# Patient Record
Sex: FEMALE | Employment: FULL TIME
[De-identification: names, ages, dates, MRNs, and addresses within clinical notes are randomized per-mention and may not be internally consistent; named-entity substitution may affect disease eponyms.]

---

## 2017-06-10 ENCOUNTER — HEALTH MAINTENANCE LETTER (OUTPATIENT)
Age: 52
End: 2017-06-10

## 2018-09-06 ENCOUNTER — OFFICE VISIT (OUTPATIENT)
Dept: FAMILY MEDICINE | Facility: CLINIC | Age: 53
End: 2018-09-06
Payer: COMMERCIAL

## 2018-09-06 VITALS
DIASTOLIC BLOOD PRESSURE: 84 MMHG | BODY MASS INDEX: 28.95 KG/M2 | HEART RATE: 60 BPM | OXYGEN SATURATION: 99 % | WEIGHT: 191 LBS | HEIGHT: 68 IN | RESPIRATION RATE: 20 BRPM | SYSTOLIC BLOOD PRESSURE: 120 MMHG | TEMPERATURE: 97.3 F

## 2018-09-06 DIAGNOSIS — B35.3 TINEA PEDIS OF BOTH FEET: Primary | ICD-10-CM

## 2018-09-06 PROCEDURE — 99213 OFFICE O/P EST LOW 20 MIN: CPT | Performed by: NURSE PRACTITIONER

## 2018-09-06 NOTE — PROGRESS NOTES
"  SUBJECTIVE:   Roberta Lee is a 53 year old female who presents to clinic today for the following health issues:      Rash  Onset: 6 months    Description: Pt states that she has always had a rash on her feet, but it started to get worse over the last 6 months. Pt thinks it could be infected.  Location: Bilateral Rash/ skin breakdown on all toes.  Character: painful, draining, red  Itching (Pruritis): YES    Progression of Symptoms:  worsening    Accompanying Signs & Symptoms:  Fever: no   Body aches or joint pain: Back pain started x 1 month ago.   Sore throat symptoms: YES  Recent cold symptoms: YES. 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    History:   Previous similar rash: YES    Precipitating factors:   Exposure to similar rash: no   New exposures: None   Recent travel: no     Alleviating factors:  None    Therapies Tried and outcome: None    Problem list and histories reviewed & adjusted, as indicated.  Additional history: as documented    Patient Active Problem List   Diagnosis     Depressive disorder, not elsewhere classified     Anxiety state     Past Surgical History:   Procedure Laterality Date     C APPENDECTOMY       C  DELIVERY ONLY      Primary C/S     C  DELIVERY ONLY      Repeat C/S     C  DELIVERY ONLY      Repeat C/S     C LIGATE FALLOPIAN TUBE       C NONSPECIFIC PROCEDURE      Right wrist fracture in [employer] -- Abstracted 02       Social History   Substance Use Topics     Smoking status: Current Every Day Smoker     Years: 20.00     Smokeless tobacco: Never Used      Comment: 1 cig per day     Alcohol use 2.0 oz/week      Comment: Occasional, mostly beer     Family History   Problem Relation Age of Onset     Cerebrovascular Disease Brother       age 42 of a \"massive stroke\"     Diabetes Father      Born in 1935     Family History Negative Mother      Born in 1938         No " "current outpatient prescriptions on file.     Allergies   Allergen Reactions     No Known Allergies        Reviewed and updated as needed this visit by clinical staff  Tobacco  Allergies  Meds  Problems  Med Hx  Surg Hx  Fam Hx  Soc Hx        Reviewed and updated as needed this visit by Provider  Allergies  Meds  Problems         ROS:  CONSTITUTIONAL: NEGATIVE for fever, chills, change in weight  INTEGUMENTARY/SKIN: POSITIVE for rash around toes bilaterally worse on the right  RESP: NEGATIVE for significant cough or SOB  CV: NEGATIVE for chest pain, palpitations or peripheral edema  PSYCHIATRIC: NEGATIVE for changes in mood or affect  ROS otherwise negative    OBJECTIVE:     /84  Pulse 60  Temp 97.3  F (36.3  C) (Tympanic)  Resp 20  Ht 5' 7.75\" (1.721 m)  Wt 191 lb (86.6 kg)  SpO2 99%  BMI 29.26 kg/m2  Body mass index is 29.26 kg/(m^2).  GENERAL: healthy, alert and no distress  SKIN: Patient has fungal rash around toes that is not currently draining but is between all the toes and under toes causing skin peeling and cracking in the skin.  No signs or symptoms of infection.  Pruritic and burning but no warmth on palpation.  PSYCH: mentation appears normal, affect normal/bright    Diagnostic Test Results:  none     ASSESSMENT/PLAN:     1. Tinea pedis of both feet  Recommend over the counter Lamisil cream or spray for up to 2 weeks for treatment.  Discussed importance of keeping feet dry.  If symptoms persist despite treatment, f/u in clinic for re-evaluation.    See Patient Instructions    Kylah Perry NP  [facility]    "

## 2018-09-06 NOTE — MR AVS SNAPSHOT
"              After Visit Summary   2018    Roberta Chester    MRN: [id number]           Patient Information     Date Of Birth          1965        Visit Information        Provider Department      2018 9:20 AM Kylah Perry NP [facility]        Care Instructions    1.  Apply Lamisil to both feet daily for up to 2 weeks.  2.  If this is not beneficial to symptoms, follow-up in clinic for re-evaluation.          Follow-ups after your visit        Who to contact     If you have questions or need follow up information about today's clinic visit or your schedule please contact [facility] directly at [phone].  Normal or non-critical lab and imaging results will be communicated to you by LifeDoxhart, letter or phone within 4 business days after the clinic has received the results. If you do not hear from us within 7 days, please contact the clinic through LifeDoxhart or phone. If you have a critical or abnormal lab result, we will notify you by phone as soon as possible.  Submit refill requests through MediaSpike or call your pharmacy and they will forward the refill request to us. Please allow 3 business days for your refill to be completed.          Additional Information About Your Visit        MyChart Information     MediaSpike lets you send messages to your doctor, view your test results, renew your prescriptions, schedule appointments and more. To sign up, go to [email or web address] . Click on \"Log in\" on the left side of the screen, which will take you to the Welcome page. Then click on \"Sign up Now\" on the right side of the page.     You will be asked to enter the access code listed below, as well as some personal information. Please follow the directions to create your username and password.     Your access code is: [email or web address]  Expires: 2018  9:53 AM     Your access code will  in 90 days. If you need help or a new code, please call your " "[facility] or [phone].        Care EveryWhere ID     This is your Care EveryWhere ID. This could be used by other organizations to access your [facility] records  [id number]        Your Vitals Were     Pulse Temperature Respirations Height Pulse Oximetry BMI (Body Mass Index)    60 97.3  F (36.3  C) (Tympanic) 20 5' 7.75\" (1.721 m) 99% 29.26 kg/m2       Blood Pressure from Last 3 Encounters:   09/06/18 120/84   11/29/06 120/72   06/02/06 110/68    Weight from Last 3 Encounters:   09/06/18 191 lb (86.6 kg)   11/29/06 191 lb (86.6 kg)   06/02/06 188 lb (85.3 kg)              Today, you had the following     No orders found for display       Primary Care Provider Office Phone # Fax #    Kapil Davalos [phone]       [address] [place] [address]        Equal Access to Services     [facility]: Hadii chelly ku hadasho Soomaali, waaxda luqadaha, qaybta kaalmada adeegyada, waxay luanne bobo . So [facility] [phone].    ATENCIÓN: Si habla español, tiene a woo disposición servicios gratuitos de asistencia lingüística. Llame al [phone].    We comply with applicable federal civil rights laws and [place] laws. We do not discriminate on the basis of race, color, national origin, age, disability, sex, sexual orientation, or gender identity.            Thank you!     Thank you for choosing [facility]  for your care. Our goal is always to provide you with excellent care. Hearing back from our patients is one way we can continue to improve our services. Please take a few minutes to complete the written survey that you may receive in the mail after your visit with us. Thank you!             Your Updated Medication List - Protect others around you: Learn how to safely use, store and throw away your medicines at www.disposemymeds.org.      Notice  As of 9/6/2018  9:53 AM    You have not been prescribed any medications.      "

## 2018-09-06 NOTE — PATIENT INSTRUCTIONS
1.  Apply Lamisil to both feet daily for up to 2 weeks.  2.  If this is not beneficial to symptoms, follow-up in clinic for re-evaluation.

## 2019-07-18 ENCOUNTER — TELEPHONE (OUTPATIENT)
Dept: INTERNAL MEDICINE | Facility: CLINIC | Age: 54
End: 2019-07-18

## 2019-07-18 NOTE — TELEPHONE ENCOUNTER
Panel Management Review      Patient has the following on her problem list: None      Composite cancer screening  Chart review shows that this patient is due/due soon for the following Pap Smear, Mammogram and Colonoscopy  Summary:    Patient is due/failing the following:   COLONOSCOPY, MAMMOGRAM and PAP    Action needed:   Patient needs office visit for pap. and Patient needs referral/order: mammogram and colonoscopy    Type of outreach:    Sent letter.    Questions for provider review:    None                                                                                                                                    CHANG SAMPSON CMA       Chart routed to no one .

## 2019-07-18 NOTE — LETTER
7/18/2019  Roberta Chester  [address]  [place] [place] [address]    Dear Roberta    We care about your health and have reviewed your health plan. We have reviewed your medical conditions, medication list, and lab results and am making recommendations based on this review, to better manage your health.    You are in particular need of attention regarding:  -Breast Cancer Screening  -Colon Cancer Screening  -Cervical Cancer Screening    We am recommending that you:  -schedule a MAMMOGRAM which is due.    1 in 8 women will develop invasive breast cancer during her lifetime and it is the most common non-skin cancer in American women.  EARLY detection, new treatments, and a better understanding of the disease have increased survival rates - the 5 year survival rate in the 1960s was 63% and today it is close to 90%.    If you are under/uninsured, we recommend you contact the [facility]. They offer mammograms at no charge or on a sliding fee charge. You can schedule with them at [phone]. Please have them send us the results.      Please disregard this reminder if you have had this exam elsewhere within the last year.  It would be helpful for us to have a copy of your mammogram report in your file so that we can best coordinate your care - please contact us with when your test was done so we can update your record.             -schedule a COLONOSCOPY to look for colon cancer (due every 10 years or 5 years in higher risk situations.)        Colon cancer is now the second leading cause of cancer-related deaths in the [place] for both men and women and there are over 130,000 new cases and 50,000 deaths per year from colon cancer.  Colonoscopies can prevent 90-95% of these deaths.  Problem lesions can be removed before they ever become cancer.  This test is not only looking for cancer, but also getting rid of precancerious lesions.    If you are under/uninsured, we recommend you contact the [facility]. [facility] is a free colorectal cancer screening program that provides colonoscopies for eligible under/uninsured [place] men and women. If you are interested in receiving a free colonoscopy, please call [facility] at [phone] (mention code [facility]) to see if you re eligible.      If you do not wish to do a colonoscopy or cannot afford to do one, at this time, there is another option. It is called a FIT test or Fecal Immunochemical Occult Blood Test (take home stool sample kit).  It does not replace the colonoscopy for colorectal cancer screening, but it can detect hidden bleeding in the lower colon.  It does need to be repeated every year and if a positive result is obtained, you would be referred for a colonoscopy.          If you have completed either one of these tests at another facility, please call with the details of when and where the tests were done and if they were normal or not. Or have the records sent to our clinic so that we can best coordinate your care.    -schedule a PAP SMEAR EXAM which is due.  Please disregard this reminder if you have had this exam elsewhere within the last year.  It would be helpful for us to have a copy of your recent pap smear report in our file so that we can best coordinate your care.    If you are under/uninsured, we recommend you contact the [facility]. They offer pap smears at no charge or on a sliding fee charge. You can schedule with them at [phone]. Please have them send us the results.      Health Maintenance Due   Topic Date Due     DEPRESSION ACTION PLAN  1965     PHQ-9  1965     COLONOSCOPY  03/21/1977     HIV SCREENING  03/21/1982     DTAP/TDAP/TD IMMUNIZATION (1 - Tdap) 03/21/1992     PREVENTIVE CARE VISIT  02/10/2006     MAMMO SCREENING  03/11/2007     PAP  02/10/2008     LIPID  03/21/2012     ZOSTER IMMUNIZATION (1 of 2) 03/21/2017         Please call us at [phone] ([place]) or use [employer] to address  the above recommendations.     Thank you for trusting [facility] and we appreciate the opportunity to serve you.  We look forward to supporting your healthcare needs in the future.    Healthy Regards,    Your [facility]  July 18, 2019

## 2021-06-24 ENCOUNTER — TELEPHONE (OUTPATIENT)
Dept: INTERNAL MEDICINE | Facility: CLINIC | Age: 56
End: 2021-06-24

## 2021-06-24 NOTE — TELEPHONE ENCOUNTER
Lump on right side/front of neck.  Ongoing but getting worse in the last 4 days. Size inbetween a pea and marble. Tender to touch.  Feels hard. Not hot, no red streaking.  No fever known.  Does not stop her from moving her head.     Advised to be seen.  Appointment made for Monday.  Tom Lemon, HARVEYN, RN, PHN  [facility] ~ Registered Nurse  Clinic Triage ~ [place] [facility]  June 24, 2021

## 2021-06-28 ENCOUNTER — OFFICE VISIT (OUTPATIENT)
Dept: FAMILY MEDICINE | Facility: CLINIC | Age: 56
End: 2021-06-28
Payer: COMMERCIAL

## 2021-06-28 VITALS
TEMPERATURE: 97.8 F | HEART RATE: 84 BPM | HEIGHT: 67 IN | RESPIRATION RATE: 18 BRPM | WEIGHT: 207.8 LBS | SYSTOLIC BLOOD PRESSURE: 122 MMHG | BODY MASS INDEX: 32.62 KG/M2 | DIASTOLIC BLOOD PRESSURE: 80 MMHG

## 2021-06-28 DIAGNOSIS — F32.0 MILD MAJOR DEPRESSION (H): ICD-10-CM

## 2021-06-28 DIAGNOSIS — L72.3 INFLAMED EPIDERMOID CYST OF SKIN: Primary | ICD-10-CM

## 2021-06-28 PROCEDURE — 99214 OFFICE O/P EST MOD 30 MIN: CPT | Performed by: PHYSICIAN ASSISTANT

## 2021-06-28 RX ORDER — CEPHALEXIN 500 MG/1
500 CAPSULE ORAL 3 TIMES DAILY
Qty: 21 CAPSULE | Refills: 0 | Status: SHIPPED | OUTPATIENT
Start: 2021-06-28 | End: 2021-07-05

## 2021-06-28 ASSESSMENT — ANXIETY QUESTIONNAIRES
6. BECOMING EASILY ANNOYED OR IRRITABLE: SEVERAL DAYS
7. FEELING AFRAID AS IF SOMETHING AWFUL MIGHT HAPPEN: SEVERAL DAYS
3. WORRYING TOO MUCH ABOUT DIFFERENT THINGS: NOT AT ALL
1. FEELING NERVOUS, ANXIOUS, OR ON EDGE: SEVERAL DAYS
5. BEING SO RESTLESS THAT IT IS HARD TO SIT STILL: NOT AT ALL
GAD7 TOTAL SCORE: 3
2. NOT BEING ABLE TO STOP OR CONTROL WORRYING: NOT AT ALL

## 2021-06-28 ASSESSMENT — PATIENT HEALTH QUESTIONNAIRE - PHQ9
SUM OF ALL RESPONSES TO PHQ QUESTIONS 1-9: 6
5. POOR APPETITE OR OVEREATING: NOT AT ALL

## 2021-06-28 ASSESSMENT — PAIN SCALES - GENERAL: PAINLEVEL: MODERATE PAIN (5)

## 2021-06-28 ASSESSMENT — MIFFLIN-ST. JEOR: SCORE: 1579.16

## 2021-06-28 NOTE — PROGRESS NOTES
"Assessment & Plan   Inflamed epidermoid cyst of skin  Patient reports her 1 week history of a swollen painful and red lump on her neck.  States that she has had a \"blackhead\" there for many years and she scratched it the other day.  After that she noticed redness and pain and swelling started.  Exam is mostly consistent with an inflamed cyst.  There is no true fluctuance.  Shared decision making discussed.  Patient did not want an I&D today.  I do not think it is significantly infected either.  More likely to be inflamed that will improve with warm compresses and ibuprofen.  Rx for Keflex as well just in case.  If symptoms not improve significantly over the next week patient will return to clinic for I&D or sooner for any new, concerning, worsening symptoms.  - cephALEXin (KEFLEX) 500 MG capsule; Take 1 capsule (500 mg) by mouth 3 times daily for 7 days    Mild major depression (H)  PHQ-9 is within normal limits today.  Stable.  Continue lifestyle changes.     Tobacco Cessation:   reports that she has been smoking. She has smoked for the past 20.00 years. She has never used smokeless tobacco.    BMI:   Estimated body mass index is 32.3 kg/m  as calculated from the following:    Height as of this encounter: 1.708 m (5' 7.25\").    Weight as of this encounter: 94.3 kg (207 lb 12.8 oz).       Return in about 1 week (around 7/5/2021), or if symptoms worsen or fail to improve, for In-Clinic Visit.    HÉCTOR Verma [facility]    Bindu Granados is a 56 year old who presents for the following health issues     HPI     Concern - Lump on neck   Onset: One week  Description: Says that she had always seemed to have a black head in this spot. Would make sure to clean it and it never really got to a point where she would have to squeeze it. Says that in the last week it has gotten bigger become firm and is painful   Intensity: moderate  Progression of Symptoms:  worsening  Accompanying " "Signs & Symptoms: pain and redness   Previous history of similar problem: na   Precipitating factors:        Worsened by: na  Alleviating factors:        Improved by: na  Therapies tried and outcome:  none   Denies any fevers, chills, fatigue, body aches.    Review of Systems   See HPI      Objective    /80 (BP Location: Right arm, Patient Position: Sitting, Cuff Size: Adult Large)   Pulse 84   Temp 97.8  F (36.6  C) (Tympanic)   Resp 18   Ht 1.708 m (5' 7.25\")   Wt 94.3 kg (207 lb 12.8 oz)   BMI 32.30 kg/m    Body mass index is 32.3 kg/m .  Physical Exam   Constitutional: healthy, alert, and no distress  Head: Normocephalic. Atraumatic  Eyes: No conjunctival injection, sclera anicteric  Respiratory: No resp distress.  Musculoskeletal: extremities normal- no gross deformities noted, and normal muscle tone  Neurologic: Gait normal. CN 2-12 grossly intact  Psychiatric: mentation appears normal and affect normal/bright  Skin: There is a 3 cm in diameter area of induration with overlying erythema that is moderately tender.  No surrounding erythema, streaking.  No significant fluctuance.            "

## 2021-06-28 NOTE — PATIENT INSTRUCTIONS
I think this is an inflammed cyst. I would like you to take Ibuprofen 600-800 mg three times per day for the next 1 week. You can take this at the same time as your keflex (antibiotic).     When at home, use warm compresses 3-4 times per day. 20 minutes on.     Patient Education     Infected Epidermoid Cyst (Antibiotic Treatment)   You have an epidermoid cyst. This is a small, painless lump under your skin. An epidermoid cyst is often called an epidermal cyst, an epidermal inclusion cyst, or incorrectly, a sebaceous cyst. Epidermoid cysts form slowly under the skin. They can be found on most parts of the body. But they are most often found on areas with more hair such as the scalp, face, upper back, and genitals.   Here are some general facts about these cysts:     A cyst is a sac filled with material that is often cheesy, fatty, oily, or stringy. The material inside can be thick. Or it can be a liquid.    The area around the cyst may smell bad. If the cyst breaks open, the material inside it often smells bad as well.    You can usually move the cyst slightly if you try.    The cyst can be smaller than a pea or as large as a few inches.    The cyst is usually not painful, unless it becomes inflamed or infected.  Your cyst became infected and your healthcare provider wants to treat it with antibiotics. You will likely take the antibiotics by mouth or apply it as a cream, or both. If the antibiotics don t clear up the infection, the cyst will need to be drained by making a small cut (incision). Local anesthesia will be used to numb the area before the incision and drainage.   Home care    Resist the temptation to squeeze or pop the cyst, stick a needle in it, or cut it open. This often leads to a worsening infection and scarring.    If antibiotic pills were prescribed, take them exactly as directed. Finish the antibiotic prescribed, even though you may feel better after the first few days.    Soak the affected area in  hot water or apply a hot pack (a thin, clean towel soaked in hot water) for 20 minutes at a time. Do this 3 to 4 times a day.    If your healthcare provider recommended it, apply antibiotic cream or ointment 2 to 3 times a day.    You may use over-the-counter pain medicine to control pain, unless another medicine was given. If you have chronic liver or kidney disease or ever had a stomach ulcer or gastrointestinal bleeding, talk with your healthcare provider before using these medicines.    Prevention  Once this infection has healed, reduce the risk of future infections by:     Keeping the cyst area clean by bathing or showering daily    Avoiding tight-fitting clothing in the cyst area  Follow-up care  Follow up with your healthcare provider, or as advised. If a gauze packing was put in your wound, it should be removed in a few days as advised by your healthcare provider. Check your wound every day for the signs listed below.   When to seek medical advice  Call your healthcare provider right away if any of these occur:     Pus coming from the cyst    Increasing redness around the wound    Increasing local pain or swelling    Fever of 100.4 F (38 C) or higher, or as directed by your provider  [place] last reviewed this educational content on 7/1/2019 2000-2021 [employer]. All rights reserved. This information is not intended as a substitute for professional medical care. Always follow your healthcare professional's instructions.            Scheduled for induction due to post dates gestation

## 2021-06-29 ASSESSMENT — ANXIETY QUESTIONNAIRES: GAD7 TOTAL SCORE: 3

## 2021-07-08 ENCOUNTER — OFFICE VISIT (OUTPATIENT)
Dept: FAMILY MEDICINE | Facility: CLINIC | Age: 56
End: 2021-07-08
Payer: COMMERCIAL

## 2021-07-08 VITALS
WEIGHT: 207 LBS | DIASTOLIC BLOOD PRESSURE: 96 MMHG | BODY MASS INDEX: 32.18 KG/M2 | HEART RATE: 70 BPM | SYSTOLIC BLOOD PRESSURE: 132 MMHG

## 2021-07-08 DIAGNOSIS — L02.11 CUTANEOUS ABSCESS OF NECK: Primary | ICD-10-CM

## 2021-07-08 PROCEDURE — 99213 OFFICE O/P EST LOW 20 MIN: CPT | Mod: 25 | Performed by: PHYSICIAN ASSISTANT

## 2021-07-08 PROCEDURE — 10060 I&D ABSCESS SIMPLE/SINGLE: CPT | Performed by: PHYSICIAN ASSISTANT

## 2021-07-08 RX ORDER — LIDOCAINE HYDROCHLORIDE AND EPINEPHRINE 10; 10 MG/ML; UG/ML
2 INJECTION, SOLUTION INFILTRATION; PERINEURAL ONCE
Status: COMPLETED | OUTPATIENT
Start: 2021-07-08 | End: 2021-07-08

## 2021-07-08 RX ORDER — DOXYCYCLINE 100 MG/1
100 CAPSULE ORAL 2 TIMES DAILY
Qty: 14 CAPSULE | Refills: 0 | Status: SHIPPED | OUTPATIENT
Start: 2021-07-08

## 2021-07-08 RX ADMIN — LIDOCAINE HYDROCHLORIDE AND EPINEPHRINE 2 ML: 10; 10 INJECTION, SOLUTION INFILTRATION; PERINEURAL at 10:29

## 2021-07-08 ASSESSMENT — PAIN SCALES - GENERAL: PAINLEVEL: MODERATE PAIN (4)

## 2021-07-08 NOTE — PROGRESS NOTES
"Assessment & Plan   Cutaneous abscess of neck  Patient presents after 1 week of attempting to treat her abscess with antibiotics and warm compresses.  Her symptoms have persisted and the area has started to drain.  She is here for I&D.  Exam is as expected.  I&D was performed with a moderate amount of purulent drainage.  The cavity was packed with iodoform gauze which should be removed in 2 to 3 days.  Patient was comfortable doing this at home. Rx for doxycycline due the size of the abscess.  Wound care discussed.  She will monitor for signs and symptoms of worsening infection and return to clinic as needed for any new, concerning, worsening symptoms.  - lidocaine 1% with EPINEPHrine 1:100,000 injection 2 mL  - doxycycline hyclate (VIBRAMYCIN) 100 MG capsule; Take 1 capsule (100 mg) by mouth 2 times daily  - DRAIN SKIN ABSCESS SIMPLE/SINGLE     Tobacco Cessation:   reports that she has been smoking. She has smoked for the past 20.00 years. She has never used smokeless tobacco.    BMI:   Estimated body mass index is 32.18 kg/m  as calculated from the following:    Height as of 6/28/21: 1.708 m (5' 7.25\").    Weight as of this encounter: 93.9 kg (207 lb).     Return in about 1 week (around 7/15/2021), or if symptoms worsen or fail to improve, for In-Clinic Visit.    HÉCTOR Verma [facility]    Bindu Granados is a 56 year old who presents for the following health issues    HPI   Cellulitis   -Says that she has been trying to do the compress as told.   -Over the weekend it started to drain   There is still ongoing pain, redness.  She is here for incision and drainage.  Denies any fevers or chills, nausea or vomiting or any other systemic symptom.    Review of Systems   See HPI      Objective    BP (!) 132/96 (BP Location: Right arm, Patient Position: Sitting, Cuff Size: Adult Large)   Pulse 70   Wt 93.9 kg (207 lb)   BMI 32.18 kg/m    Body mass index is 32.18 " kg/m .  Physical Exam   Constitutional: healthy, alert, and no distress  Head: Normocephalic. Atraumatic  Eyes: No conjunctival injection, sclera anicteric  Neck: There is a 2.5 cm in diameter area of fluctuance, erythema, tenderness over the right anterior aspect of the neck.  Not pulsatile.  Respiratory: No resp distress.  Musculoskeletal: extremities normal- no gross deformities noted, and normal muscle tone  Neurologic: Gait normal. CN 2-12 grossly intact  Psychiatric: mentation appears normal and affect normal/bright    Procedure Note:   I&D - The area was prepped with alcohol. 2 cc's of 1% Lidocaine with Epi was used to anesthestized the area. Using an 11 blade, an incision was made over the most fluctuant area. The area was then drained with a moderate amount of purulent discharge. It was packed with idioform dressing, approximately 3 inches and dressed/covered. Pt tolerated procedure well without complications. EBL was minimal.

## 2021-07-08 NOTE — PATIENT INSTRUCTIONS
You did great today!  In 2 to 3 days time I would like you to remove the gauze from the area at home per your comfort.  The gauze should just come right out there may be a little pain associate with this.  It is probably easiest to do this in the shower when it is a little wet.    If you are uncomfortable with removing the gauze please remember that we can have this done in clinic by our nursing staff.    Every 24 hours please change her bandage. You do not need to scrub it very hard just softly with warm soapy water. Place an antibiotic ointment on the pad of the Band-Aid in place over the area.      Monitor for signs of worsening affection including fevers, chills, worsening pain, worsening warmth or any redness that expands from the area.    Patient Education     Wound Check, Infection  You have a wound that has become infected. The wound will not heal properly unless the infection is cleared. Infection in a wound may also spread if it is not treated. In most cases, antibiotic medicines are prescribed to treat a wound infection.   Symptoms of a wound infection include:    Redness or swelling around the wound    Warmth coming from the wound    New or worsening pain    Red streaks around the wound    Draining pus    Fever  Home care  Follow all directions you are given to treat the infection.  Medicines  Take all medicines as prescribed.     If you were given antibiotics, take them until they are gone or your healthcare provider tells you to stop. It is vital to finish the antibiotics even if you feel better. If you don't finish them, the infection may come back and be harder to treat.    If your infection is not responding to the medicines you are taking, you may be prescribed new medicines.    Take medicine for pain as directed by your healthcare provider.  Wound care  Care for your wound as directed by your healthcare provider.    Apply a warm compress (clean cloth soaked in hot water) to the infected area for  about 5 to 10 minutes at a time. Be very careful not to burn yourself. Test the cloth on a non-infected area to make sure it is not too hot.    Continue to change the dressing daily. If it becomes wet, stained with wound fluid, or dirty, change it sooner. To change it:  ? Wash your hands with soap and water before changing the dressing.  ? Carefully remove the dressing and tape. If it sticks to the wound, you may need to wet it a little to remove it. (Don't do this if your healthcare provider has told you not to.)  ? Gently clean the wound with clean water (or saline) using gauze, a clean washcloth, or cotton swab.  ? Don't use soap, alcohol, peroxide or other cleansers.  ? If you were told to dry the wound before putting on a new dressing, gently pat. Don't rub.  ? Throw out the old dressing.  ? Wash your hands again before opening the new, clean dressing.  ? Wash your hands again when you are done.  Follow-up care  Follow up with your healthcare provider as advised. If a culture was done, you will be notified if your treatment needs to change. Call as directed for the results.  When to seek medical advice  Call your healthcare provider right away if any of these occur:    Symptoms of infection don't start to improve within 2 days of starting antibiotics    Symptoms of infection get worse    New symptoms, such as red streaks around the wound    Fever of 100.4 F (38.0 C) or higher for more than 2 days after starting the antibiotics, or as advised by your healthcare provider  [employer] last reviewed this educational content on 3/1/2018    3314-9563 [employer]. All rights reserved. This information is not intended as a substitute for professional medical care. Always follow your healthcare professional's instructions.

## 2025-03-03 ENCOUNTER — OFFICE VISIT (OUTPATIENT)
Dept: PEDIATRICS | Facility: CLINIC | Age: 60
End: 2025-03-03
Payer: COMMERCIAL

## 2025-03-03 ENCOUNTER — TELEPHONE (OUTPATIENT)
Dept: FAMILY MEDICINE | Facility: CLINIC | Age: 60
End: 2025-03-03

## 2025-03-03 VITALS
TEMPERATURE: 97.5 F | HEART RATE: 72 BPM | SYSTOLIC BLOOD PRESSURE: 166 MMHG | OXYGEN SATURATION: 96 % | RESPIRATION RATE: 18 BRPM | DIASTOLIC BLOOD PRESSURE: 86 MMHG

## 2025-03-03 DIAGNOSIS — S01.511A INFECTED LIP LACERATION, INITIAL ENCOUNTER: Primary | ICD-10-CM

## 2025-03-03 DIAGNOSIS — S02.5XXA CLOSED FRACTURE OF TOOTH, INITIAL ENCOUNTER: ICD-10-CM

## 2025-03-03 DIAGNOSIS — L08.9 INFECTED LIP LACERATION, INITIAL ENCOUNTER: Primary | ICD-10-CM

## 2025-03-03 PROCEDURE — 1125F AMNT PAIN NOTED PAIN PRSNT: CPT | Performed by: EMERGENCY MEDICINE

## 2025-03-03 PROCEDURE — 3079F DIAST BP 80-89 MM HG: CPT | Performed by: EMERGENCY MEDICINE

## 2025-03-03 PROCEDURE — 90715 TDAP VACCINE 7 YRS/> IM: CPT | Performed by: EMERGENCY MEDICINE

## 2025-03-03 PROCEDURE — 3077F SYST BP >= 140 MM HG: CPT | Performed by: EMERGENCY MEDICINE

## 2025-03-03 PROCEDURE — 90471 IMMUNIZATION ADMIN: CPT | Performed by: EMERGENCY MEDICINE

## 2025-03-03 PROCEDURE — 99203 OFFICE O/P NEW LOW 30 MIN: CPT | Mod: 25 | Performed by: EMERGENCY MEDICINE

## 2025-03-03 RX ORDER — PENICILLIN V POTASSIUM 500 MG/1
500 TABLET, FILM COATED ORAL 3 TIMES DAILY
Qty: 21 TABLET | Refills: 0 | Status: SHIPPED | OUTPATIENT
Start: 2025-03-03 | End: 2025-03-10

## 2025-03-03 RX ORDER — METRONIDAZOLE 500 MG/1
500 TABLET ORAL 3 TIMES DAILY
Qty: 21 TABLET | Refills: 0 | Status: SHIPPED | OUTPATIENT
Start: 2025-03-03 | End: 2025-03-10

## 2025-03-03 ASSESSMENT — PAIN SCALES - GENERAL: PAINLEVEL_OUTOF10: MODERATE PAIN (5)

## 2025-03-03 NOTE — PROGRESS NOTES
Acute [facility] Diagnostic Services Clinic Visit    {PROVIDER CHARTING PREFERENCE:576802}    Subjective   Roberta is a 59 year old, presenting for the following health issues:  Mouth/Lip Problem    HPI      Concern - mouth infection  Onset: over the weekend  Description: tripped on concrete step, tooth possibly went through bottom lip - was fine at first but symptoms have worsened day by day. Now has visible swelling of the lip and face, redness, pain. Is concerned for infection. Says she maybe should have gone in for stitches.   Intensity: moderate  Progression of Symptoms:  worsening  Accompanying Signs & Symptoms: burning, stinging, pus  Previous history of similar problem: no  Precipitating factors:        Worsened by: chewing  Alleviating factors:        Improved by: ibuprofen  Therapies tried and outcome: OTC ibuprofen about every 12 hours gives some relief      {ROS Picklists (Optional):841867}      Objective    BP (!) 166/86   Pulse 72   Temp 97.5  F (36.4  C) (Tympanic)   Resp 18   SpO2 96%   There is no height or weight on file to calculate BMI.  Physical Exam   {Exam List (Optional):503531}    {Diagnostic Test Results (Optional):562769}        Signed Electronically by: RAMA YOUNG MD  {Email feedback regarding this note to [email or web address]   :[id number]}

## 2025-03-03 NOTE — PROGRESS NOTES
Impression:  1.  Lower lip laceration probably through and through, 3 days old.  2.  Fracture of the tip of the upper left central incisor 3.  Wound infection of the lower lip laceration    Plan:  Patient's tetanus status was updated with a Tdap booster.  Will start her on penicillin and Flagyl for wound infection.  Apply antibiotic ointment and then wash off the external wound 2-3 times per day to remove any crusting lesions.  Use mouthwash 2-3 times per day to decrease the bacterial burden on the inner lip.  Return or seek care if the redness and swelling and tenderness are not improving or do not resolve after a 7-day course of antibiotics.  See a dentist for the fractured tooth      Chief Complaint:  Patient presents with:  Mouth/Lip Problem         HPI:   Roberta Chester is a 59 year old female who presents to this clinic for the evaluation of lower lip laceration.  3 days ago the patient tripped and fell forward landing with her chin and lower lip against a piece of concrete.  She sustained a laceration to both the inner lip and the outer lip and chipped her upper left central incisor.  She did not seek care at that time but now presents because the area has gotten slightly swollen and red and tender to the touch.  No difficulty swallowing or breathing.  No loss of consciousness.  No neck pain.  No numbness or weakness in the extremities.  No other injuries.  No other complaints      PMH:   Past Medical History:   Diagnosis Date    Anxiety state, unspecified     Depressive disorder, not elsewhere classified     Abstracted 02     Past Surgical History:   Procedure Laterality Date    [facility] APPENDECTOMY      Z  DELIVERY ONLY      Primary C/S    ZZC  DELIVERY ONLY      Repeat C/S    ZZC  DELIVERY ONLY      Repeat C/S    ZC LIGATE FALLOPIAN TUBE      ZZ NONSPECIFIC PROCEDURE      Right wrist fracture in [employer] -- Abstracted 02          ROS:  All other systems negative    Meds:  No current outpatient medications on file.        Social:  Social History     Socioeconomic History    Marital status:      Spouse name: Not on file    Number of children: 3    Years of education: Not on file    Highest education level: Not on file   Occupational History    Occupation:    Tobacco Use    Smoking status: Every Day     Types: Cigarettes    Smokeless tobacco: Never    Tobacco comments:     1 cig per day   Substance and Sexual Activity    Alcohol use: Yes     Alcohol/week: 3.3 standard drinks of alcohol     Comment: Occasional, mostly beer    Drug use: No    Sexual activity: Not Currently     Partners: Male     Birth control/protection: Surgical     Comment: Tubal Ligation   Other Topics Concern    Parent/sibling w/ CABG, MI or angioplasty before 65F 55M? Not Asked   Social History Narrative    ** Merged History Encounter **         Working in [employer].      Social Drivers of Health     Financial Resource Strain: Not on file   Food Insecurity: Not on file   Transportation Needs: Not on file   Physical Activity: Not on file   Stress: Not on file   Social Connections: Not on file   Interpersonal Safety: Not on file   Housing Stability: Not on file         Physical Exam:  Vitals:    03/03/25 1105   BP: (!) 166/86   Pulse: 72   Resp: 18   Temp: 97.5  F (36.4  C)   TempSrc: Tympanic   SpO2: 96%      Patient is awake, alert, no distress  Eyes: PERRL, EOMI  Head: There is soft tissue swelling and erythema and tenderness over the lower lip just inferior to the vermilion border.  There is a crusted laceration on the external area of the lower lip in this area.  The inner mucosal lip shows a smooth laceration in the same area.  This may represent a through and through laceration of the lower lip.  The vermilion border is not involved.  There is some soft tissue swelling and erythema and tenderness surrounding the lower lip  laceration  Pharynx: Clear, airway patent, there is a chip off the tip of the left upper central incisor.  Dentition is otherwise intact  Neck: No mass or tenderness  Neuro: Normal motor and sensory function in all extremities  Psych: Awake, alert, normally responsive      Results:    No results found for this or any previous visit (from the past 24 hours).     No results found for this or any previous visit (from the past 24 hours).       Robin Das MD

## 2025-03-03 NOTE — TELEPHONE ENCOUNTER
Pt reports falling this weekend onto concrete step. Thinks her bottom teeth went thru skin between her bottom lip and chin. Pt was able to get wound to stop bleeding right away but this morning she noticed swelling, redness and a foul odor coming from her wound. Also there is pus coming out. No clinic appts avail. Recommended UC or ADS. Pt would like ADS if available. Denies any other injuries.   Will route to ADS        Roman Cuadra RN

## 2025-08-19 ENCOUNTER — OFFICE VISIT (OUTPATIENT)
Dept: OBGYN | Facility: CLINIC | Age: 60
End: 2025-08-19
Payer: COMMERCIAL

## 2025-08-19 VITALS
WEIGHT: 219 LBS | SYSTOLIC BLOOD PRESSURE: 146 MMHG | BODY MASS INDEX: 34.37 KG/M2 | DIASTOLIC BLOOD PRESSURE: 82 MMHG | HEART RATE: 84 BPM | HEIGHT: 67 IN

## 2025-08-19 DIAGNOSIS — N64.4 BREAST TENDERNESS IN FEMALE: ICD-10-CM

## 2025-08-19 DIAGNOSIS — Z11.3 SCREENING FOR STD (SEXUALLY TRANSMITTED DISEASE): ICD-10-CM

## 2025-08-19 DIAGNOSIS — N89.8 VAGINAL DISCHARGE: ICD-10-CM

## 2025-08-19 DIAGNOSIS — N95.2 VAGINAL ATROPHY: ICD-10-CM

## 2025-08-19 DIAGNOSIS — N95.0 POSTMENOPAUSAL BLEEDING: Primary | ICD-10-CM

## 2025-08-19 DIAGNOSIS — Z12.11 SCREENING FOR COLON CANCER: ICD-10-CM

## 2025-08-19 DIAGNOSIS — Z12.4 CERVICAL CANCER SCREENING: ICD-10-CM

## 2025-08-19 LAB
C TRACH DNA SPEC QL PROBE+SIG AMP: NEGATIVE
CLUE CELLS: PRESENT
ERYTHROCYTE [DISTWIDTH] IN BLOOD BY AUTOMATED COUNT: 12.7 % (ref 10–15)
HCT VFR BLD AUTO: 45.6 % (ref 35–47)
HGB BLD-MCNC: 15.6 G/DL (ref 11.7–15.7)
MCH RBC QN AUTO: 32.2 PG (ref 26.5–33)
MCHC RBC AUTO-ENTMCNC: 34.2 G/DL (ref 31.5–36.5)
MCV RBC AUTO: 94.2 FL (ref 78–100)
N GONORRHOEA DNA SPEC QL NAA+PROBE: NEGATIVE
PLATELET # BLD AUTO: 228 10E3/UL (ref 150–450)
PROLACTIN SERPL 3RD IS-MCNC: 16 NG/ML (ref 5–23)
RBC # BLD AUTO: 4.84 10E6/UL (ref 3.8–5.2)
SPECIMEN TYPE: NORMAL
T4 FREE SERPL-MCNC: 0.95 NG/DL (ref 0.9–1.7)
TRICHOMONAS, WET PREP: ABNORMAL
TSH SERPL DL<=0.005 MIU/L-ACNC: 7.14 UIU/ML (ref 0.3–4.2)
WBC # BLD AUTO: 5.62 10E3/UL (ref 4–11)
WBC'S/HIGH POWER FIELD, WET PREP: ABNORMAL
YEAST, WET PREP: ABNORMAL

## 2025-08-19 PROCEDURE — 87210 SMEAR WET MOUNT SALINE/INK: CPT | Performed by: PHYSICIAN ASSISTANT

## 2025-08-19 PROCEDURE — 84146 ASSAY OF PROLACTIN: CPT | Performed by: PHYSICIAN ASSISTANT

## 2025-08-19 PROCEDURE — 99204 OFFICE O/P NEW MOD 45 MIN: CPT | Mod: 25 | Performed by: PHYSICIAN ASSISTANT

## 2025-08-19 PROCEDURE — 99459 PELVIC EXAMINATION: CPT | Performed by: PHYSICIAN ASSISTANT

## 2025-08-19 PROCEDURE — 87624 HPV HI-RISK TYP POOLED RSLT: CPT | Performed by: PHYSICIAN ASSISTANT

## 2025-08-19 PROCEDURE — 3079F DIAST BP 80-89 MM HG: CPT | Performed by: PHYSICIAN ASSISTANT

## 2025-08-19 PROCEDURE — 3077F SYST BP >= 140 MM HG: CPT | Performed by: PHYSICIAN ASSISTANT

## 2025-08-19 PROCEDURE — 58100 BIOPSY OF UTERUS LINING: CPT | Performed by: PHYSICIAN ASSISTANT

## 2025-08-19 PROCEDURE — 84443 ASSAY THYROID STIM HORMONE: CPT | Performed by: PHYSICIAN ASSISTANT

## 2025-08-19 PROCEDURE — 84439 ASSAY OF FREE THYROXINE: CPT | Performed by: PHYSICIAN ASSISTANT

## 2025-08-19 PROCEDURE — 36415 COLL VENOUS BLD VENIPUNCTURE: CPT | Performed by: PHYSICIAN ASSISTANT

## 2025-08-19 PROCEDURE — 87491 CHLMYD TRACH DNA AMP PROBE: CPT | Performed by: PHYSICIAN ASSISTANT

## 2025-08-19 PROCEDURE — 87591 N.GONORRHOEAE DNA AMP PROB: CPT | Performed by: PHYSICIAN ASSISTANT

## 2025-08-19 PROCEDURE — 85027 COMPLETE CBC AUTOMATED: CPT | Performed by: PHYSICIAN ASSISTANT

## 2025-08-20 ENCOUNTER — HOSPITAL ENCOUNTER (OUTPATIENT)
Dept: ULTRASOUND IMAGING | Facility: CLINIC | Age: 60
Discharge: HOME | End: 2025-08-20
Attending: PHYSICIAN ASSISTANT
Payer: COMMERCIAL

## 2025-08-20 DIAGNOSIS — N95.0 POSTMENOPAUSAL BLEEDING: ICD-10-CM

## 2025-08-20 LAB
HPV HR 12 DNA CVX QL NAA+PROBE: NEGATIVE
HPV16 DNA CVX QL NAA+PROBE: NEGATIVE
HPV18 DNA CVX QL NAA+PROBE: NEGATIVE
HUMAN PAPILLOMA VIRUS FINAL DIAGNOSIS: NORMAL
PATH REPORT.COMMENTS IMP SPEC: NORMAL
PATH REPORT.COMMENTS IMP SPEC: NORMAL
PATH REPORT.FINAL DX SPEC: NORMAL
PATH REPORT.GROSS SPEC: NORMAL
PATH REPORT.MICROSCOPIC SPEC OTHER STN: NORMAL
PATH REPORT.RELEVANT HX SPEC: NORMAL
PHOTO IMAGE: NORMAL

## 2025-08-20 PROCEDURE — 76856 US EXAM PELVIC COMPLETE: CPT

## 2025-08-27 LAB
BKR AP ASSOCIATED HPV REPORT: ABNORMAL
BKR LAB AP GYN ADEQUACY: ABNORMAL
BKR LAB AP GYN INTERPRETATION: ABNORMAL
BKR LAB AP PREVIOUS ABNORMAL: ABNORMAL
PATH REPORT.COMMENTS IMP SPEC: ABNORMAL
PATH REPORT.COMMENTS IMP SPEC: ABNORMAL
PATH REPORT.RELEVANT HX SPEC: ABNORMAL

## 2025-08-28 ENCOUNTER — PATIENT OUTREACH (OUTPATIENT)
Dept: OBGYN | Facility: CLINIC | Age: 60
End: 2025-08-28
Payer: COMMERCIAL

## 2025-08-28 PROBLEM — R87.610 ASCUS OF CERVIX WITH NEGATIVE HIGH RISK HPV: Status: ACTIVE | Noted: 2025-08-19

## 2025-09-04 ENCOUNTER — HOSPITAL ENCOUNTER (OUTPATIENT)
Dept: MAMMOGRAPHY | Facility: CLINIC | Age: 60
Discharge: HOME | End: 2025-09-04
Attending: PHYSICIAN ASSISTANT
Payer: COMMERCIAL

## 2025-09-04 ENCOUNTER — HOSPITAL ENCOUNTER (OUTPATIENT)
Dept: ULTRASOUND IMAGING | Facility: CLINIC | Age: 60
Discharge: HOME | End: 2025-09-04
Attending: PHYSICIAN ASSISTANT
Payer: COMMERCIAL

## 2025-09-04 DIAGNOSIS — N64.4 BREAST TENDERNESS IN FEMALE: ICD-10-CM

## 2025-09-04 PROCEDURE — 77062 BREAST TOMOSYNTHESIS BI: CPT

## 2025-09-04 PROCEDURE — 76642 ULTRASOUND BREAST LIMITED: CPT | Mod: 50
